# Patient Record
Sex: MALE | Race: WHITE | Employment: OTHER | URBAN - METROPOLITAN AREA
[De-identification: names, ages, dates, MRNs, and addresses within clinical notes are randomized per-mention and may not be internally consistent; named-entity substitution may affect disease eponyms.]

---

## 2022-04-27 ENCOUNTER — ANCILLARY PROCEDURE (OUTPATIENT)
Dept: PET IMAGING | Facility: CLINIC | Age: 77
End: 2022-04-27
Attending: UROLOGY
Payer: MEDICARE

## 2022-04-27 DIAGNOSIS — C61 MALIGNANT NEOPLASM OF PROSTATE (H): ICD-10-CM

## 2022-07-11 ENCOUNTER — PRE VISIT (OUTPATIENT)
Dept: UROLOGY | Facility: CLINIC | Age: 77
End: 2022-07-11

## 2022-07-12 NOTE — TELEPHONE ENCOUNTER
MEDICAL RECORDS REQUEST   [facility]  [facility]  [address] [place] [address]  PHONE: [phone]  Fax: [phone]        FUTURE VISIT INFORMATION                                                   Rickey Gonzáles, : 1945 scheduled for future visit at [facility]    APPOINTMENT INFORMATION:    Date: 2022    Provider:  Karlo Arellano MD    Reason for Visit/Diagnosis: Incontinence      RECORDS REQUESTED FOR VISIT                                                     NOTES  STATUS/DETAILS   OFFICE NOTE from referring provider  in process   OFFICE NOTE from other specialist  in process   DISCHARGE SUMMARY from hospital  in process   DISCHARGE REPORT from the ER  in process   OPERATIVE REPORT  in process   MEDICATION LIST  yes   LABS     URINALYSIS (UA)  in process     PRE-VISIT CHECKLIST      Record collection complete If no, please explain pending   Appointment appropriately scheduled           (right time/right provider) Yes   Joint diagnostic appointment coordinated correctly          (ensure right order & amount of time) Yes   MyChart activation No   Questionnaire complete If no, please explain pending

## 2023-03-01 ENCOUNTER — HOSPITAL ENCOUNTER (OUTPATIENT)
Dept: NUCLEAR MEDICINE | Facility: CLINIC | Age: 78
Setting detail: NUCLEAR MEDICINE
Discharge: HOME | End: 2023-03-01
Attending: UROLOGY
Payer: MEDICARE

## 2023-03-01 DIAGNOSIS — C61 MALIGNANT TUMOR OF PROSTATE (H): ICD-10-CM

## 2023-03-01 PROCEDURE — 343N000001 HC RX 343: Performed by: UROLOGY

## 2023-03-01 PROCEDURE — A9503 TC99M MEDRONATE: HCPCS | Performed by: UROLOGY

## 2023-03-01 PROCEDURE — 78306 BONE IMAGING WHOLE BODY: CPT

## 2023-03-01 RX ORDER — TC 99M MEDRONATE 20 MG/10ML
25 INJECTION, POWDER, LYOPHILIZED, FOR SOLUTION INTRAVENOUS ONCE
Status: COMPLETED | OUTPATIENT
Start: 2023-03-01 | End: 2023-03-01

## 2023-03-01 RX ADMIN — TC 99M MEDRONATE 26.6 MILLICURIE: 20 INJECTION, POWDER, LYOPHILIZED, FOR SOLUTION INTRAVENOUS at 11:41
